# Patient Record
(demographics unavailable — no encounter records)

---

## 2024-10-10 NOTE — END OF VISIT
[] : 5 [FeedbackText] : After meeting with Federico Bray I have a much better understanding of how to work this into workflow.  I think all providers should continue with the initial training which was excellent.  But they all should meet with someone even if just for 30 minutes to go over some functions that they may not be aware of and give some tips that have been learned from other providers.  The more I use this the better it gets

## 2024-10-10 NOTE — HISTORY OF PRESENT ILLNESS
[de-identified] : COUGH, SORE THROAT, VOMITING AND HEADACHE  [FreeTextEntry6] :  Subjective:   - Summary: Primary school student Narendra presented today with complaints of a sore throat, headache, back pain, lack of energy and vomiting. He has a history of frequent strep throat, which has been detected 4-5 times per year for the last two years.   - Chief Complaint (CC): Narendra reported having a sore throat, headache, back pain, no energy, and vomiting clear fluid once in the morning.   - History of Present Illness: Narendra started feeling poorly two days ago and symptoms intensified yesterday leading him to return home from school early. This morning he vomited clear fluid. Now he complains of a headache, sore throat, lack of energy, and back pain. The patient also has a white tongue and a cough but no experiences of diarrhea or rash. There have been no recent medical interventions. He has been dealing with similar incidents consistently over the past several years, often diagnosed as strep throat.   - Past Medical History: Not available for the current encounter.   - Past Surgical History: Not available for the current encounter.   - Family History:     - Not mentioned in the conversation.   - Social History:     - Education: Currently in the 2nd grade.   - Review of Systems: Narendra reported no changes in weight, appetite or energy levels, no symptoms of neurological, musculoskeletal, cardiovascular, respiratory, gastrointestinal, genitourinary, integumentary or psychiatric issues apart from the aforementioned chief complaints.   - General: No significant general findings reported.   - Neurological: Patient has been experiencing a headache.   - Musculoskeletal: Patient describes having back pain.   - Cardiovascular: No cardiovascular symptoms reported.   - Respiratory: Patient has been coughing.   - Gastrointestinal: Patient vomited clear fluid once this morning. No diarrhea reported.   - Genitourinary: No genitourinary symptoms reported.   - Integumentary: Patient's tongue is white, but no other integumentary symptoms reported.   - Psychiatric: Not mentioned in the conversation.   - Medications: No current medications mentioned in the conversation.   - Allergies: No known allergies.

## 2024-10-10 NOTE — DISCUSSION/SUMMARY
[FreeTextEntry1] :   Assessment and Plan:   - Recurrent Strep Throat: Narendra has been experiencing recurrent instances of what has frequently been diagnosed as strep throat. His symptoms today, including a white tongue, are also suggestive of a possible strep throat infection.     - Therapeutic Interventions: In this case because of the mother's experience with him we decided to treat him while we are waiting for the overnight culture     - Diagnostic Tests: Strep test ordered.  Rapid test negative     - Referrals: Referral to ENT specialist given the patient's history of recurrent strep throat.      - Patient Education: Discussed the possibility of tonsillectomy considering the frequency of strep infections and impact on school attendance. Advised mother to observe for snoring during sleep.      - Follow-Up: Schedule follow-up appointment pending strep test results.

## 2024-11-11 NOTE — HISTORY OF PRESENT ILLNESS
[de-identified] : fill out paperwork [FreeTextEntry6] : Pt brought in for [facility] paperwork. Child has history of recurrent strep throat infections, about 6 in the last year, requiring various antibiotics. Mom has to take time off work to care for patient, and siblings who usually get same infection. Child will be taken to ENT next month for evaluation and possible tonsillectomy. Would like [facility] paperwork in the interim if in case he gets sick again, as well as for post-operative care. Last had strep 1 month ago, charts reviewed.

## 2024-11-11 NOTE — DISCUSSION/SUMMARY
[FreeTextEntry1] : Filled out paperwork in support of FMLA for parent, including letter of support written by Dr. Way at previous visit.   Encounter for flu shot today as well.  [] : The components of the vaccine(s) to be administered today are listed in the plan of care. The disease(s) for which the vaccine(s) are intended to prevent and the risks have been discussed with the caretaker.  The risks are also included in the appropriate vaccination information statements which have been provided to the patient's caregiver.  The caregiver has given consent to vaccinate.

## 2024-11-11 NOTE — PHYSICAL EXAM
[Erythematous Oropharynx] : nonerythematous oropharynx [Enlarged Tonsils] : tonsils not enlarged [No Abnormal Lymph Nodes Palpated] : no abnormal lymph nodes palpated [NL] : warm, clear

## 2024-11-11 NOTE — DISCUSSION/SUMMARY
[] : The components of the vaccine(s) to be administered today are listed in the plan of care. The disease(s) for which the vaccine(s) are intended to prevent and the risks have been discussed with the caretaker.  The risks are also included in the appropriate vaccination information statements which have been provided to the patient's caregiver.  The caregiver has given consent to vaccinate. [FreeTextEntry1] : Filled out paperwork in support of FMLA for parent, including letter of support written by Dr. Way at previous visit.   Encounter for flu shot today as well.

## 2024-12-05 NOTE — REASON FOR VISIT
[Initial Evaluation] : an initial evaluation for [Mother] : mother [Throat Infections] : throat infections

## 2024-12-05 NOTE — PHYSICAL EXAM
[Normal Gait and Station] : normal gait and station [Normal muscle strength, symmetry and tone of facial, head and neck musculature] : normal muscle strength, symmetry and tone of facial, head and neck musculature [Normal] : no cervical lymphadenopathy [Effusion] : no effusion [1+] : 1+ [Increased Work of Breathing] : no increased work of breathing with use of accessory muscles and retractions

## 2024-12-05 NOTE — HISTORY OF PRESENT ILLNESS
[No Personal or Family History of Easy Bruising, Bleeding, or Issues with General Anesthesia] : No Personal or Family History of easy bruising, bleeding, or issues with general anesthesia [de-identified] : Today I had the pleasure of seeing RATNA LANGELY. RATNA is a 7 year boy who presents for: Recurrent strep  History was obtained from patient, parent and chart.  Referred by PCP Dr. Edwar Way   1 strep this year so far in November 4-5 strep + throat infections last school year Previous school year had strep 4-5 times  Has had negative tests in the past Has done salt water gargles when sick   Chronic nasal congestion  Always sneezing in the morning  Never been allergy tested  Never used nasal steroid spray  Occasional snoring  No pausing choking or gasping  No ear infections No concerns for hearing or speech  Passed [facility]

## 2024-12-05 NOTE — CONSULT LETTER
[Dear  ___] : Dear  [unfilled], [Courtesy Letter:] : I had the pleasure of seeing your patient, [unfilled], in my office today. [Please see my note below.] : Please see my note below. [Consult Closing:] : Thank you very much for allowing me to participate in the care of this patient.  If you have any questions, please do not hesitate to contact me. [Sincerely,] : Sincerely, [FreeTextEntry2] : Dr. Edwar Way  [address] [place] [id number] [phone] [FreeTextEntry3] : Melissa Shah MD Pediatric Otolaryngology / Head and Neck Surgery    [facility] [address] [place] [address] Tel [phone] Fax [phone]    [facility] [address] [place] [address]  Tel [phone] Fax [phone]

## 2024-12-05 NOTE — ASSESSMENT
[FreeTextEntry1] : RATNA is a 7 year old boy presenting for recurrent tonsillitis  Recurrent Tonsillitis, strep + 5 last year 1 this year not a carrier - Discussed options for observation, medical management, or surgery.  - recommend observation at this time, return if continued infections, salt water gargles  Education provided: Can consider tonsillectomy to decrease the frequency of recurrent bacterial pharyngitis: (7 in the past year, 5 in the past 2 years, 3 in the past 3 years).  This will not decrease viral pharyngitis episodes.    T&A Consent for Tonsillectomy and Adenoidectomy The risks, benefits and alternatives of tonsillectomy and adenoidectomy were discussed.   The risks of tonsillectomy include but are not limited to: bleeding, which can range from mild requiring observation to more serious or life-threatening bleeding necessitating hospitalization, blood transfusion, and/or return to the operating room for control; voice change, infection, pain, dehydration, swallowing difficulty, need for additional surgery, nasal regurgitation and risk of anesthesia (which will be discussed by the anesthesiologist). Benefits in the case of recurrent tonsillopharyngitis include a reduction (but not necessarily a complete cure) in the number of throat infections, and in the case of obstructive sleep apnea (TEJ) include a decrease in severity of TEJ, which can be curative, but in many cases residual TEJ may occur. Alternatives in the case of recurrent tonsillopharyngitis include observation and continued antibiotic treatment, and in the case of TEJ observation, medical therapy, Continuous Positive Airway Pressure(CPAP), Bilevel Positive Airway Pressure (BiPAP) other surgical options. Non-treatment of TEJ is associated with decreased sleep and its sequelae, and in severe cases can have cardiovascular complications.  The risks, benefits and alternatives of adenoidectomy were discussed. The risks include but are not limited to: bleeding, which can range from mild requiring observation to more serious necessitating hospitalization, blood transfusion, return to the operating room for control and in extreme cases death; voice change- specifically velopharyngeal insufficiency which can affect the nasal resonance; infection, pain, dehydration, swallowing difficulty, need for additional surgery, nasal regurgitation and risk of anesthesia (which will be discussed by the anesthesiologist). Benefits in the case of recurrent adenoiditis include a reduction (but not necessarily a complete cure) in the number of adenoid infections; in the case of nasal obstruction an improvement of nasal airway and decreased rhinorrhea; and in the case of obstructive sleep apnea (TEJ) include a decrease in severity of TEJ, which can be curative, but in many cases residual TEJ may occur. Alternatives in the case of recurrent adenoiditis include observation and continued antibiotic treatment; in the case of nasal obstruction observation or medical therapy including but not limited to antihistamines, intranasal/systemic steroids; and in the case of TEJ observation, medical therapy, Continuous Positive Airway Pressure(CPAP), Bilevel Positive Airway Pressure (BiPAP) other surgical options. Non-treatment of TEJ is associated with decreased sleep and its sequelae, and in severe cases can have cardiovascular complications.

## 2025-05-12 NOTE — HISTORY OF PRESENT ILLNESS
[de-identified] : Chest Pain [FreeTextEntry6] : BIB dad with c/o of getting hit in chest at soccer practice 2 days ago and has had chest pain on left side.  No bruising or injury noted.

## 2025-05-12 NOTE — DISCUSSION/SUMMARY
[FreeTextEntry1] : Anticipatory guidance and parent education given.  Pain is likely due to chest wall injury from soccer ball.  Recommend NSAIDS q 6 hours for 48 hours, encourage rest and may use ice packs.  F/u if symptoms worsen or persist.

## 2025-05-12 NOTE — PHYSICAL EXAM
[Symmetric Chest Wall] : symmetric chest wall [Tenderness With Palpation of Chest Wall] : tenderness with palpation of chest wall [NL] : warm, clear [de-identified] : left chest over nipple/breast area tender with palpation.  No ecchymosis or edema noted.

## 2025-06-10 NOTE — HISTORY OF PRESENT ILLNESS
[FreeTextEntry6] : Seasonally trigger itchy, watery, puffy eyes without pain or infectious like discharge. Stuffy nose that may have clear discharge at times. Sneezing, but minimal to no disruptive coughing or asthma signs or symptoms. No fever.  [de-identified] : POSSIBLE PINK EYE

## 2025-06-10 NOTE — DISCUSSION/SUMMARY
[FreeTextEntry1] : When severe, a short (1-3 day) course of prednisone as prescribed will help get the symptoms under control. But long-term use is not an option owing to side effects.   Avoidance of triggers, when possible, when linked to the following tiered approach generally manages symptoms. Use of as much of, or as few as, the following is recommended. Start with all of them and wean off from the last to the first: 1. Zyrtec in the morning and Benadryl with dosing based in weight and age 2. Flonase or Nasacort once daily based on age 3. Pataday eye drops (QD or BID options)  4. Prednisone prescription only as directed

## 2025-06-10 NOTE — PHYSICAL EXAM
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

## 2025-07-01 NOTE — HISTORY OF PRESENT ILLNESS
[de-identified] : eye dc  [FreeTextEntry6] : Reviewed last visit  went away then came back  Using Pataday helping partially